# Patient Record
Sex: FEMALE | Race: WHITE
[De-identification: names, ages, dates, MRNs, and addresses within clinical notes are randomized per-mention and may not be internally consistent; named-entity substitution may affect disease eponyms.]

---

## 2020-02-06 ENCOUNTER — HOSPITAL ENCOUNTER (OUTPATIENT)
Dept: HOSPITAL 76 - LAB.N | Age: 20
Discharge: HOME | End: 2020-02-06
Attending: NURSE PRACTITIONER
Payer: MEDICAID

## 2020-02-06 DIAGNOSIS — Z79.899: Primary | ICD-10-CM

## 2020-02-06 LAB
ALBUMIN DIAFP-MCNC: 4.3 G/DL (ref 3.2–5.5)
ALBUMIN/GLOB SERPL: 1.2 {RATIO} (ref 1–2.2)
ALP SERPL-CCNC: 86 IU/L (ref 42–121)
ALT SERPL W P-5'-P-CCNC: 27 IU/L (ref 10–60)
ANION GAP SERPL CALCULATED.4IONS-SCNC: 12 MMOL/L (ref 6–13)
AST SERPL W P-5'-P-CCNC: 23 IU/L (ref 10–42)
BASOPHILS NFR BLD AUTO: 0 10^3/UL (ref 0–0.1)
BASOPHILS NFR BLD AUTO: 0.5 %
BILIRUB BLD-MCNC: 0.3 MG/DL (ref 0.2–1)
BUN SERPL-MCNC: 7 MG/DL (ref 6–20)
CALCIUM UR-MCNC: 9.3 MG/DL (ref 8.5–10.3)
CHLORIDE SERPL-SCNC: 101 MMOL/L (ref 101–111)
CO2 SERPL-SCNC: 25 MMOL/L (ref 21–32)
CREAT SERPLBLD-SCNC: 0.6 MG/DL (ref 0.4–1)
EOSINOPHIL # BLD AUTO: 0.1 10^3/UL (ref 0–0.7)
EOSINOPHIL NFR BLD AUTO: 1.2 %
ERYTHROCYTE [DISTWIDTH] IN BLOOD BY AUTOMATED COUNT: 14.4 % (ref 12–15)
GFRSERPLBLD MDRD-ARVRAT: 129 ML/MIN/{1.73_M2} (ref 89–?)
GLOBULIN SER-MCNC: 3.5 G/DL (ref 2.1–4.2)
GLUCOSE SERPL-MCNC: 95 MG/DL (ref 70–100)
HGB UR QL STRIP: 11.6 G/DL (ref 12–16)
LYMPHOCYTES # SPEC AUTO: 2.1 10^3/UL (ref 1.5–3.5)
LYMPHOCYTES NFR BLD AUTO: 28.1 %
MCH RBC QN AUTO: 27.9 PG (ref 27–31)
MCHC RBC AUTO-ENTMCNC: 31.4 G/DL (ref 32–36)
MCV RBC AUTO: 88.9 FL (ref 81–99)
MONOCYTES # BLD AUTO: 0.5 10^3/UL (ref 0–1)
MONOCYTES NFR BLD AUTO: 6.6 %
NEUTROPHILS # BLD AUTO: 4.6 10^3/UL (ref 1.5–6.6)
NEUTROPHILS # SNV AUTO: 7.3 X10^3/UL (ref 4.8–10.8)
NEUTROPHILS NFR BLD AUTO: 63.3 %
PDW BLD AUTO: 10.9 FL (ref 7.9–10.8)
PLATELET # BLD: 367 10^3/UL (ref 130–450)
PROT SPEC-MCNC: 7.8 G/DL (ref 6.7–8.2)
RBC MAR: 4.16 10^6/UL (ref 4.2–5.4)
SODIUM SERPLBLD-SCNC: 138 MMOL/L (ref 135–145)

## 2020-02-06 PROCEDURE — 85025 COMPLETE CBC W/AUTO DIFF WBC: CPT

## 2020-02-06 PROCEDURE — 84443 ASSAY THYROID STIM HORMONE: CPT

## 2020-02-06 PROCEDURE — 80053 COMPREHEN METABOLIC PANEL: CPT

## 2020-02-06 PROCEDURE — 80175 DRUG SCREEN QUAN LAMOTRIGINE: CPT

## 2020-02-06 PROCEDURE — 36415 COLL VENOUS BLD VENIPUNCTURE: CPT

## 2020-03-23 ENCOUNTER — HOSPITAL ENCOUNTER (EMERGENCY)
Dept: HOSPITAL 76 - ED | Age: 20
Discharge: HOME | End: 2020-03-23
Payer: MEDICAID

## 2020-03-23 VITALS — DIASTOLIC BLOOD PRESSURE: 105 MMHG | SYSTOLIC BLOOD PRESSURE: 153 MMHG

## 2020-03-23 DIAGNOSIS — J02.0: Primary | ICD-10-CM

## 2020-03-23 PROCEDURE — 99283 EMERGENCY DEPT VISIT LOW MDM: CPT

## 2020-03-23 PROCEDURE — 99284 EMERGENCY DEPT VISIT MOD MDM: CPT

## 2020-03-23 PROCEDURE — 87430 STREP A AG IA: CPT

## 2020-03-23 PROCEDURE — 87070 CULTURE OTHR SPECIMN AEROBIC: CPT

## 2020-03-23 NOTE — ED PHYSICIAN DOCUMENTATION
History of Present Illness





- Stated complaint


Stated Complaint: COUGH,SORE THROAT





- Chief complaint


Chief Complaint: Resp





- History obtained from


History obtained from: Patient





Results





- Vitals


Vitals: 


                               Vital Signs - 24 hr











  03/23/20





  19:25


 


Temperature 97.7 C H


 


Heart Rate 112 H


 


Respiratory 18





Rate 


 


Blood Pressure 153/105 H


 


O2 Saturation 100








                                     Oxygen











O2 Source                      Room air

















PD MEDICAL DECISION MAKING





- ED course


ED course: 





Downtime H&P for Kimberly Benavides date of birth 2000


Time seen 1955 on 3/23/2020


Chief complaint sore throat and cough


HPI previously healthy 19-year-old has had a nonproductive cough for 5 days.  

Shortness of breath that is mild with exertion only.  Temperature up to 102.  

Sore throat especially on the right and unable to eat because of pain on the 

right side of the throat.  No runny nose or sick contacts.  No recent travel.


Review of systems


General positive for fevers and chills


ENT positive for sore throat negative for runny nose


Pulmonary positive for cough and shortness of breath


CV negative for chest pain


GI negative for abdominal pain or nausea.


Past medical history none


Social history lives with her mother


Exam vital signs notable for mild tachycardia and hypertension


General well-appearing woman in no distress


ENT notable for some ulcerations on the tip of the right tongue consistent with 

viral etiology, tonsillar pillars are red but no tonsillar exudates.


Neck no adenopathy


Pulmonary clear to auscultation bilaterally nonlabored


Abdomen nontender


Skin no rash


Neuro A/O x3


Medical decision making, rapid strep negative here but her sisters rapid strep 

who is also with her was positive and therefore treating presumptively.


Diagnosis strep pharyngitis


Prescription azithromycin Z-Gil due to penicillin allergy 


Hydrocodone 5/325 1-2 every 6 hours as needed pain #10 no refills





Instructions follow-up with your doctor in 1 week, return for new or worsening 

symptoms.  Plenty fluids.





Departure





- Departure


Disposition: 01 Home, Self Care


Clinical Impression: 


 Strep pharyngitis





Condition: Good

## 2020-03-30 ENCOUNTER — HOSPITAL ENCOUNTER (OUTPATIENT)
Dept: HOSPITAL 76 - COV | Age: 20
Discharge: HOME | End: 2020-03-30
Attending: FAMILY MEDICINE
Payer: MEDICAID

## 2020-03-30 DIAGNOSIS — R50.9: Primary | ICD-10-CM

## 2020-03-30 PROCEDURE — 81599 UNLISTED MAAA: CPT

## 2020-04-22 ENCOUNTER — HOSPITAL ENCOUNTER (EMERGENCY)
Dept: HOSPITAL 76 - ED | Age: 20
Discharge: HOME | End: 2020-04-22
Payer: MEDICAID

## 2020-04-22 VITALS — SYSTOLIC BLOOD PRESSURE: 150 MMHG | DIASTOLIC BLOOD PRESSURE: 103 MMHG

## 2020-04-22 DIAGNOSIS — B37.3: ICD-10-CM

## 2020-04-22 DIAGNOSIS — R30.0: Primary | ICD-10-CM

## 2020-04-22 LAB
C KRUSEI DNA VAG QL NAA+PROBE: (no result)
CANDIDA DNA VAG QL NAA+PROBE: (no result)
CLARITY UR REFRACT.AUTO: CLEAR
GLUCOSE UR QL STRIP.AUTO: NEGATIVE MG/DL
HCG UR QL: NEGATIVE
KETONES UR QL STRIP.AUTO: NEGATIVE MG/DL
NITRITE UR QL STRIP.AUTO: NEGATIVE
PH UR STRIP.AUTO: 6 PH (ref 5–7.5)
PROT UR STRIP.AUTO-MCNC: NEGATIVE MG/DL
RBC # UR STRIP.AUTO: (no result) /UL
RBC # URNS HPF: (no result) /HPF (ref 0–5)
SP GR UR STRIP.AUTO: >=1.03 (ref 1–1.03)
SQUAMOUS URNS QL MICRO: (no result)
T VAGINALIS RRNA GENITAL QL PROBE: (no result)
UROBILINOGEN UR QL STRIP.AUTO: (no result) E.U./DL
UROBILINOGEN UR STRIP.AUTO-MCNC: NEGATIVE MG/DL

## 2020-04-22 PROCEDURE — 87086 URINE CULTURE/COLONY COUNT: CPT

## 2020-04-22 PROCEDURE — 87491 CHLMYD TRACH DNA AMP PROBE: CPT

## 2020-04-22 PROCEDURE — 87661 TRICHOMONAS VAGINALIS AMPLIF: CPT

## 2020-04-22 PROCEDURE — 99283 EMERGENCY DEPT VISIT LOW MDM: CPT

## 2020-04-22 PROCEDURE — 99284 EMERGENCY DEPT VISIT MOD MDM: CPT

## 2020-04-22 PROCEDURE — 87481 CANDIDA DNA AMP PROBE: CPT

## 2020-04-22 PROCEDURE — 81025 URINE PREGNANCY TEST: CPT

## 2020-04-22 PROCEDURE — 87801 DETECT AGNT MULT DNA AMPLI: CPT

## 2020-04-22 PROCEDURE — 81001 URINALYSIS AUTO W/SCOPE: CPT

## 2020-04-22 PROCEDURE — 87591 N.GONORRHOEAE DNA AMP PROB: CPT

## 2020-04-22 PROCEDURE — 81003 URINALYSIS AUTO W/O SCOPE: CPT

## 2020-04-22 NOTE — ED PHYSICIAN DOCUMENTATION
History of Present Illness





- Stated complaint


Stated Complaint: FEMALE 





- Chief complaint


Chief Complaint: Abd Pain





- History obtained from


History obtained from: Patient, Family





- History of Present Illness


Timing: Other (1 month ago after taking 2 rounds of abx for strep pharyngitis)


Pain level max: 7


Pain level now: 6





- Additonal information


Additional information: 





19-year-old female presents to the emergency department stating that she has 

vaginal discharge that is thick and white for the past month.  Itching and 

burning.  Nothing makes it better or worse.  Started after antibiotics for strep

throat.  She is having burning with urination as well.  States she is not 

sexually active.





Review of Systems


Constitutional: denies: Fever, Chills


Cardiac: denies: Chest pain / pressure


Respiratory: denies: Cough


GI: denies: Vomiting, Diarrhea


Skin: denies: Rash


Musculoskeletal: denies: Neck pain, Back pain


Neurologic: denies: Headache





PD PAST MEDICAL HISTORY





- Past Medical History


Past Medical History: No





- Past Surgical History


Past Surgical History: No





- Present Medications


Home Medications: 


                                Ambulatory Orders











 Medication  Instructions  Recorded  Confirmed


 


Fluconazole [Diflucan] 150 mg PO Q3D #3 tablet 04/22/20 














- Allergies


Allergies/Adverse Reactions: 


                                    Allergies











Allergy/AdvReac Type Severity Reaction Status Date / Time


 


Penicillins Allergy  Rash Verified 04/22/20 18:07














- Living Situation


Living Situation: reports: With family


Living Arrangement: reports: At home





- Social History


Does the pt smoke?: No


Does the pt drink ETOH?: No


Does the pt have substance abuse?: No





- Family History


Family history: reports: Non contributory





PD ED PE NORMAL





- Vitals


Vital signs reviewed: Yes





- General


General: Alert and oriented X 3, No acute distress





- HEENT


HEENT: Moist mucous membranes





- Neck


Neck: Supple, no meningeal sign





- Cardiac


Cardiac: RRR





- Respiratory


Respiratory: No respiratory distress, Clear bilaterally





- Abdomen


Abdomen: Soft, Non tender, Non distended





- Female 


Female : Chaperone present (TUCKER Burnham), Other (Speculum exam not performed 

due to patient comfort.  External exam reveals an erythematous introitus and 

vulva.  Thick white discharge.  Small satellite lesions.)





- Back


Back: No CVA TTP





- Derm


Derm: Warm and dry





- Neuro


Neuro: Alert and oriented X 3





Results





- Vitals


Vitals: 


                               Vital Signs - 24 hr











  04/22/20





  18:02


 


Temperature 36.5 C


 


Heart Rate 106 H


 


Respiratory 16





Rate 


 


Blood Pressure 150/103 H


 


O2 Saturation 100








                                     Oxygen











O2 Source                      Room air

















PD MEDICAL DECISION MAKING





- ED course


Complexity details: reviewed results, re-evaluated patient, considered 

differential, d/w patient, d/w family


ED course: 





Patient with what appears to be a vaginal yeast infection.  This appears to be a

 significant infection.  Will place on Diflucan orally.  We will give her a dose

 here followed by 3 doses at home 3 days apart.  She is well-appearing, 

nontoxic.  Afebrile.  Not sexually active.  Confirmatory swabs were sent to the 

lab.  No evidence of UTI.  Patient counseled regarding signs and symptoms for 

which I believe and urgent re-evaluation would be necessary. Patient with good 

understanding of and agreement to plan and is comfortable going home at this 

time





This document was made in part using voice recognition software. While efforts 

are made to proofread this document, sound alike and grammatical errors may 

occur.





Departure





- Departure


Disposition: 01 Home, Self Care


Clinical Impression: 


 Vaginal yeast infection





Condition: Good


Instructions:  ED Vaginal Infec Fungal Candida


Follow-Up: 


Sarah Blackman PA-C [Primary Care Provider] - 


Prescriptions: 


Fluconazole [Diflucan] 150 mg PO Q3D #3 tablet


Comments: 


Use the pills as prescribed.  Return if you worsen.  You were given the first 

dose tonight.  Take the next pill in 3 days and then follow that with the other 

pills.  We will call you if a change in your medication is required.

## 2020-04-27 ENCOUNTER — HOSPITAL ENCOUNTER (OUTPATIENT)
Dept: HOSPITAL 76 - LAB.R | Age: 20
Discharge: HOME | End: 2020-04-27
Attending: PHYSICIAN ASSISTANT
Payer: MEDICAID

## 2020-04-27 DIAGNOSIS — B37.9: Primary | ICD-10-CM

## 2020-04-27 LAB
C KRUSEI DNA VAG QL NAA+PROBE: NEGATIVE
CANDIDA DNA VAG QL NAA+PROBE: NEGATIVE
T VAGINALIS RRNA GENITAL QL PROBE: NEGATIVE

## 2020-04-27 PROCEDURE — 87661 TRICHOMONAS VAGINALIS AMPLIF: CPT

## 2020-04-27 PROCEDURE — 87801 DETECT AGNT MULT DNA AMPLI: CPT

## 2020-05-11 ENCOUNTER — HOSPITAL ENCOUNTER (OUTPATIENT)
Dept: HOSPITAL 76 - LAB.R | Age: 20
Discharge: HOME | End: 2020-05-11
Attending: NURSE PRACTITIONER
Payer: MEDICAID

## 2020-05-11 DIAGNOSIS — N76.0: Primary | ICD-10-CM

## 2020-05-11 PROCEDURE — 87801 DETECT AGNT MULT DNA AMPLI: CPT

## 2020-05-11 PROCEDURE — 87661 TRICHOMONAS VAGINALIS AMPLIF: CPT

## 2020-05-19 ENCOUNTER — HOSPITAL ENCOUNTER (OUTPATIENT)
Dept: HOSPITAL 76 - LAB.R | Age: 20
Discharge: HOME | End: 2020-05-19
Attending: NURSE PRACTITIONER
Payer: MEDICAID

## 2020-05-19 ENCOUNTER — HOSPITAL ENCOUNTER (OUTPATIENT)
Dept: HOSPITAL 76 - LAB.WCP | Age: 20
Discharge: HOME | End: 2020-05-19
Attending: NURSE PRACTITIONER
Payer: MEDICAID

## 2020-05-19 DIAGNOSIS — N76.0: Primary | ICD-10-CM

## 2020-05-19 DIAGNOSIS — B37.9: ICD-10-CM

## 2020-05-19 LAB
ANION GAP SERPL CALCULATED.4IONS-SCNC: 8 MMOL/L (ref 6–13)
BASOPHILS NFR BLD AUTO: 0 10^3/UL (ref 0–0.1)
BASOPHILS NFR BLD AUTO: 0.7 %
BUN SERPL-MCNC: 17 MG/DL (ref 6–20)
C KRUSEI DNA VAG QL NAA+PROBE: NEGATIVE
CALCIUM UR-MCNC: 9.5 MG/DL (ref 8.5–10.3)
CANDIDA DNA VAG QL NAA+PROBE: NEGATIVE
CHLORIDE SERPL-SCNC: 110 MMOL/L (ref 101–111)
CO2 SERPL-SCNC: 22 MMOL/L (ref 21–32)
CREAT SERPLBLD-SCNC: 0.6 MG/DL (ref 0.4–1)
EOSINOPHIL # BLD AUTO: 0.2 10^3/UL (ref 0–0.7)
EOSINOPHIL NFR BLD AUTO: 2.7 %
ERYTHROCYTE [DISTWIDTH] IN BLOOD BY AUTOMATED COUNT: 14.6 % (ref 12–15)
EST. AVERAGE GLUCOSE BLD GHB EST-MCNC: 100 MG/DL (ref 70–100)
GLUCOSE SERPL-MCNC: 87 MG/DL (ref 70–100)
HB2 TOTAL: 13.9 G/DL
HBA1C BLD-MCNC: 0.45 G/DL
HEMOGLOBIN A1C %: 5.1 % (ref 4.6–6.2)
HGB UR QL STRIP: 12.7 G/DL (ref 12–16)
LYMPHOCYTES # SPEC AUTO: 2.1 10^3/UL (ref 1.5–3.5)
LYMPHOCYTES NFR BLD AUTO: 34.8 %
MCH RBC QN AUTO: 26.6 PG (ref 27–31)
MCHC RBC AUTO-ENTMCNC: 30.5 G/DL (ref 32–36)
MCV RBC AUTO: 87.2 FL (ref 81–99)
MONOCYTES # BLD AUTO: 0.4 10^3/UL (ref 0–1)
MONOCYTES NFR BLD AUTO: 6.4 %
NEUTROPHILS # BLD AUTO: 3.3 10^3/UL (ref 1.5–6.6)
NEUTROPHILS # SNV AUTO: 5.9 X10^3/UL (ref 4.8–10.8)
NEUTROPHILS NFR BLD AUTO: 55.1 %
PDW BLD AUTO: 11.3 FL (ref 7.9–10.8)
PLATELET # BLD: 301 10^3/UL (ref 130–450)
RBC MAR: 4.78 10^6/UL (ref 4.2–5.4)
SODIUM SERPLBLD-SCNC: 140 MMOL/L (ref 135–145)
T VAGINALIS RRNA GENITAL QL PROBE: NEGATIVE

## 2020-05-19 PROCEDURE — 85025 COMPLETE CBC W/AUTO DIFF WBC: CPT

## 2020-05-19 PROCEDURE — 80048 BASIC METABOLIC PNL TOTAL CA: CPT

## 2020-05-19 PROCEDURE — 87661 TRICHOMONAS VAGINALIS AMPLIF: CPT

## 2020-05-19 PROCEDURE — 87801 DETECT AGNT MULT DNA AMPLI: CPT

## 2020-05-19 PROCEDURE — 36415 COLL VENOUS BLD VENIPUNCTURE: CPT

## 2020-05-19 PROCEDURE — 83036 HEMOGLOBIN GLYCOSYLATED A1C: CPT

## 2020-05-28 ENCOUNTER — HOSPITAL ENCOUNTER (OUTPATIENT)
Dept: HOSPITAL 76 - LAB.WCP | Age: 20
Discharge: HOME | End: 2020-05-28
Attending: OBSTETRICS & GYNECOLOGY
Payer: MEDICAID

## 2020-05-28 ENCOUNTER — HOSPITAL ENCOUNTER (OUTPATIENT)
Dept: HOSPITAL 76 - LAB.R | Age: 20
Discharge: HOME | End: 2020-05-28
Attending: OBSTETRICS & GYNECOLOGY
Payer: MEDICAID

## 2020-05-28 DIAGNOSIS — B37.9: Primary | ICD-10-CM

## 2020-05-28 LAB
ALT SERPL W P-5'-P-CCNC: 28 IU/L (ref 10–60)
AST SERPL W P-5'-P-CCNC: 24 IU/L (ref 10–42)
C KRUSEI DNA VAG QL NAA+PROBE: NEGATIVE
CANDIDA DNA VAG QL NAA+PROBE: NEGATIVE
T VAGINALIS RRNA GENITAL QL PROBE: NEGATIVE

## 2020-05-28 PROCEDURE — 87661 TRICHOMONAS VAGINALIS AMPLIF: CPT

## 2020-05-28 PROCEDURE — 36415 COLL VENOUS BLD VENIPUNCTURE: CPT

## 2020-05-28 PROCEDURE — 87801 DETECT AGNT MULT DNA AMPLI: CPT

## 2020-05-28 PROCEDURE — 84460 ALANINE AMINO (ALT) (SGPT): CPT

## 2020-05-28 PROCEDURE — 84450 TRANSFERASE (AST) (SGOT): CPT

## 2020-07-07 ENCOUNTER — HOSPITAL ENCOUNTER (OUTPATIENT)
Dept: HOSPITAL 76 - DI | Age: 20
Discharge: HOME | End: 2020-07-07
Attending: PHYSICIAN ASSISTANT
Payer: MEDICAID

## 2020-07-07 DIAGNOSIS — R13.10: Primary | ICD-10-CM

## 2020-07-07 DIAGNOSIS — E04.1: ICD-10-CM

## 2020-07-07 PROCEDURE — 76536 US EXAM OF HEAD AND NECK: CPT

## 2020-07-08 NOTE — ULTRASOUND REPORT
PROCEDURE:  Head or Neck Soft Tissue

 

INDICATIONS:  DYSPHAGIA

 

TECHNIQUE:  

Real-time scanning was performed of the thyroid gland, with image documentation.  

 

COMPARISON:  None.

 

FINDINGS:  

 

Right:  4.6 x 1.1 x 1.8 cm. Occasional tiny colloid cysts measuring less than 5 mm. No dominant mass.


 

Left:  4.1 x 0.9 x 1.6 cm. Occasional scattered sub-5 mm colloid cysts. No dominant mass.

 

Isthmus:  3 mm

 

Normal morphology lymph nodes in the lateral compartment. No bulky adenopathy in either cervical johnathan
n. No suspicious soft tissue mass.

 

IMPRESSION:  

 

 

1. Normal thyroid gland with occasional tiny colloid cysts.

 

2. No cervical chain adenopathy on either side..

 

Reviewed by: Nilam Felton MD on 7/8/2020 9:21 AM PDT

Approved by: Nilam Felton MD on 7/8/2020 9:21 AM PDT

 

 

Station ID:  [id number]

## 2020-07-20 ENCOUNTER — HOSPITAL ENCOUNTER (OUTPATIENT)
Dept: HOSPITAL 76 - DI | Age: 20
Discharge: HOME | End: 2020-07-20
Attending: PHYSICIAN ASSISTANT
Payer: MEDICAID

## 2020-07-20 DIAGNOSIS — R59.0: Primary | ICD-10-CM

## 2020-07-20 PROCEDURE — 70491 CT SOFT TISSUE NECK W/DYE: CPT

## 2020-07-20 NOTE — CT REPORT
PROCEDURE:  SOFT TISSUE NECK W

 

INDICATIONS:  CERVICAL LYMPHADENOPATHY

 

CONTRAST:  IV CONTRAST: Optiray 320 ml: 80 PO CONTRAST: *NO PO CONTRAST 

 

TECHNIQUE:  

After the administration of intravenous contrast, 3.0 mm axial sections acquired from the sella to th
e aortic arch.  Additional oblique axial 3.0 mm sections acquired through the pharynx.  3 mm thick co
charisse reformats were generated.  For radiation dose reduction, the following was used:  automated exp
osure control, adjustment of mA and/or kV according to patient size.

 

COMPARISON:  Ultrasound neck, 07/07/2020.

 

FINDINGS:  

Image quality:  Excellent.  

 

Lymph nodes: Borderline enlarged lymph nodes are seen in the right the neck. For example, a 1 cm leve
l III cervical lymph node is identified. 

 

Vessels:  Visualized vasculature appears patent.  

 

Neck spaces:  Deep to the skin marker is the right sternocleidomastoid muscle. Mass is identified in 
the area of interest.

 

The oropharynx, nasopharynx, and pharynx demonstrate no mucosal lesions.  The vocal cords, false voca
l cords, pyriform sinuses, epiglottis, vallecula, and tongue base all appear normal.  Extramucosal sp
aces appear unremarkable.  

 

Glands:  The parotid and submandibular glands appear normal.  The thyroid is normal in size. 

 

Miscellaneous:  Visualized brain and orbits appear normal.  Lung apices appear clear.  Superficial so
ft tissues appear normal.

 

Bones:  No suspicious bony lesions.  Some of cervical lordosis, which may be secondary to positioning
. Visualized sinuses and mastoids appear unremarkable.  

 

IMPRESSION: 

 

1. Borderline sized right cervical nodes measure up to 1 cm in short axis, most likely reactive.

2. No mass or grossly enlarged lymph nodes are identified in the area of interest deep to the skin anthony clark.

 

Reviewed by: Johnnie Moore MD on 7/20/2020 4:16 PM PDT

Approved by: Johnnie Moore MD on 7/20/2020 4:16 PM PDT

 

 

Station ID:  [id number]

## 2020-08-24 ENCOUNTER — HOSPITAL ENCOUNTER (OUTPATIENT)
Dept: HOSPITAL 76 - DI | Age: 20
Discharge: HOME | End: 2020-08-24
Attending: PHYSICIAN ASSISTANT
Payer: MEDICAID

## 2020-08-24 DIAGNOSIS — R51: Primary | ICD-10-CM

## 2020-08-24 PROCEDURE — 70553 MRI BRAIN STEM W/O & W/DYE: CPT

## 2020-08-24 NOTE — MRI REPORT
PROCEDURE:  Brain W/WO

 

INDICATIONS:  HEADACHE

 

CONTRAST:  IV CONTRAST: Gadavist ml: 8  

 

TECHNIQUE:  

Noncontrast axial T1 spin echo, axial T2 fast spin echo, sagittal and axial FLAIR, coronal T2 fast sp
in echo, axial gradient echo, axial diffusion and ADC through the brain.  After the administration of
 contrast, axial and coronal T1 spin echo with fat saturation through the brain.  

 

COMPARISON:  None.

 

FINDINGS:  

Image quality: Diagnostic

 

CSF spaces:  Basal cisterns are patent.  No extra-axial fluid collections.  Ventricles are normal in 
size and shape.  

 

Brain:  No midline shift.  No intracranial bleeds or masses.  No abnormal intracranial enhancement.  
There is cerebral volume loss for age.  There is periventricular white matter chronic small vessel is
chemic change.  The brainstem appears normal.  Diffusion-weighted images demonstrate no acute ischemi
c insults.  No chronic ischemic insults.  Normal intravascular flow voids are present.  

 

Skull and face:  Calvarial marrow is normal in signal.  Orbits appear normal.  

 

Sinuses:  Sinuses and mastoids appear clear.  

 

 

 

IMPRESSION:  A cause of headache cannot be seen on these images.

 

Reviewed by: Glen Sanches MD on 8/24/2020 5:04 PM [facility]

Approved by: Glen Sanches MD on 8/24/2020 5:04 PM [facility]

 

 

Station ID:  [id number]

## 2020-08-27 ENCOUNTER — HOSPITAL ENCOUNTER (OUTPATIENT)
Dept: HOSPITAL 76 - DI.N | Age: 20
Discharge: HOME | End: 2020-08-27
Attending: OBSTETRICS & GYNECOLOGY
Payer: MEDICAID

## 2020-08-27 DIAGNOSIS — N39.42: Primary | ICD-10-CM

## 2020-08-27 PROCEDURE — 74019 RADEX ABDOMEN 2 VIEWS: CPT

## 2020-09-21 ENCOUNTER — HOSPITAL ENCOUNTER (OUTPATIENT)
Dept: HOSPITAL 76 - COV | Age: 20
Discharge: HOME | End: 2020-09-21
Attending: FAMILY MEDICINE
Payer: MEDICAID

## 2020-09-21 DIAGNOSIS — R50.9: Primary | ICD-10-CM

## 2020-09-21 DIAGNOSIS — Z20.828: ICD-10-CM

## 2020-10-30 ENCOUNTER — HOSPITAL ENCOUNTER (EMERGENCY)
Dept: HOSPITAL 76 - ED | Age: 20
Discharge: HOME | End: 2020-10-30
Payer: MEDICAID

## 2020-10-30 VITALS — SYSTOLIC BLOOD PRESSURE: 104 MMHG | DIASTOLIC BLOOD PRESSURE: 67 MMHG

## 2020-10-30 DIAGNOSIS — K62.5: Primary | ICD-10-CM

## 2020-10-30 DIAGNOSIS — Z87.891: ICD-10-CM

## 2020-10-30 DIAGNOSIS — R10.31: ICD-10-CM

## 2020-10-30 LAB
ALBUMIN DIAFP-MCNC: 4.1 G/DL (ref 3.2–5.5)
ALBUMIN/GLOB SERPL: 1.2 {RATIO} (ref 1–2.2)
ALP SERPL-CCNC: 110 IU/L (ref 42–121)
ALT SERPL W P-5'-P-CCNC: 17 IU/L (ref 10–60)
ANION GAP SERPL CALCULATED.4IONS-SCNC: 13 MMOL/L (ref 6–13)
AST SERPL W P-5'-P-CCNC: 14 IU/L (ref 10–42)
BASOPHILS NFR BLD AUTO: 0 10^3/UL (ref 0–0.1)
BASOPHILS NFR BLD AUTO: 0.6 %
BILIRUB BLD-MCNC: 0.5 MG/DL (ref 0.2–1)
BUN SERPL-MCNC: 11 MG/DL (ref 6–20)
CALCIUM UR-MCNC: 9.2 MG/DL (ref 8.5–10.3)
CHLORIDE SERPL-SCNC: 103 MMOL/L (ref 101–111)
CLARITY UR REFRACT.AUTO: CLEAR
CO2 SERPL-SCNC: 22 MMOL/L (ref 21–32)
CREAT SERPLBLD-SCNC: 0.7 MG/DL (ref 0.4–1)
CRP SERPL-MCNC: < 1 MG/DL (ref 0–1)
EOSINOPHIL # BLD AUTO: 0.3 10^3/UL (ref 0–0.7)
EOSINOPHIL NFR BLD AUTO: 4.8 %
ERYTHROCYTE [DISTWIDTH] IN BLOOD BY AUTOMATED COUNT: 13.9 % (ref 12–15)
GLOBULIN SER-MCNC: 3.4 G/DL (ref 2.1–4.2)
GLUCOSE SERPL-MCNC: 92 MG/DL (ref 70–100)
GLUCOSE UR QL STRIP.AUTO: NEGATIVE MG/DL
HGB UR QL STRIP: 12.3 G/DL (ref 12–16)
KETONES UR QL STRIP.AUTO: NEGATIVE MG/DL
LYMPHOCYTES # SPEC AUTO: 2 10^3/UL (ref 1.5–3.5)
LYMPHOCYTES NFR BLD AUTO: 32.5 %
MCH RBC QN AUTO: 27.8 PG (ref 27–31)
MCHC RBC AUTO-ENTMCNC: 31.9 G/DL (ref 32–36)
MCV RBC AUTO: 86.9 FL (ref 81–99)
MONOCYTES # BLD AUTO: 0.4 10^3/UL (ref 0–1)
MONOCYTES NFR BLD AUTO: 5.8 %
NEUTROPHILS # BLD AUTO: 3.5 10^3/UL (ref 1.5–6.6)
NEUTROPHILS # SNV AUTO: 6.2 X10^3/UL (ref 4.8–10.8)
NEUTROPHILS NFR BLD AUTO: 56 %
NITRITE UR QL STRIP.AUTO: NEGATIVE
PDW BLD AUTO: 10 FL (ref 7.9–10.8)
PH UR STRIP.AUTO: 5.5 PH (ref 5–7.5)
PLATELET # BLD: 263 10^3/UL (ref 130–450)
PROT SPEC-MCNC: 7.5 G/DL (ref 6.7–8.2)
PROT UR STRIP.AUTO-MCNC: NEGATIVE MG/DL
RBC # UR STRIP.AUTO: NEGATIVE /UL
RBC MAR: 4.43 10^6/UL (ref 4.2–5.4)
SODIUM SERPLBLD-SCNC: 138 MMOL/L (ref 135–145)
SP GR UR STRIP.AUTO: >=1.03 (ref 1–1.03)
UROBILINOGEN UR QL STRIP.AUTO: (no result) E.U./DL
UROBILINOGEN UR STRIP.AUTO-MCNC: NEGATIVE MG/DL

## 2020-10-30 PROCEDURE — 81001 URINALYSIS AUTO W/SCOPE: CPT

## 2020-10-30 PROCEDURE — 85025 COMPLETE CBC W/AUTO DIFF WBC: CPT

## 2020-10-30 PROCEDURE — 87086 URINE CULTURE/COLONY COUNT: CPT

## 2020-10-30 PROCEDURE — 99284 EMERGENCY DEPT VISIT MOD MDM: CPT

## 2020-10-30 PROCEDURE — 80053 COMPREHEN METABOLIC PANEL: CPT

## 2020-10-30 PROCEDURE — 36415 COLL VENOUS BLD VENIPUNCTURE: CPT

## 2020-10-30 PROCEDURE — 81003 URINALYSIS AUTO W/O SCOPE: CPT

## 2020-10-30 PROCEDURE — 99283 EMERGENCY DEPT VISIT LOW MDM: CPT

## 2020-10-30 PROCEDURE — 86140 C-REACTIVE PROTEIN: CPT

## 2020-10-30 NOTE — ED PHYSICIAN DOCUMENTATION
PD HPI GI BLEED





- Stated complaint


Stated Complaint: BLOOD IN STOOL





- Chief complaint


Chief Complaint: Abd Pain





- History obtained from


History obtained from: Patient





- History of Present Illness


Timing - onset: How many days ago (2-3 days of some red blood mixed with stools 

and also red blood with wiping. Has rectal pain with the BMs as well. Is having 

formed stool. Noted some intermittent rlq/suprapubic area pains with pushing for

BM. No nausea nor vomiting.)


Timing - duration: Days


Timing - details: Gradual onset, Still present


Associated symptoms: BRBPR.  No: Vomiting, Diarrhea, Constipation


Contributing factors: No: Sick contact, Bad food, Travel


Improved by: Laying still.  No: Eating


Worsened by: Other (BM).  No: Eating


Similar symptoms before: Has not had sx before





Review of Systems


Constitutional: denies: Fever, Chills, Myalgias


Throat: denies: Sore throat


Cardiac: denies: Chest pain / pressure


Respiratory: denies: Cough


GI: reports: Abdominal Pain, Bloody / black stool (bright red, but somewhat 

mixed with the formed stool.).  denies: Nausea, Vomiting, Constipation, 

Diarrhea, Hematemesis


Skin: denies: Rash, Lesions


Endocrine: denies: Weight loss, Easy bruising / bleeding





PD PAST MEDICAL HISTORY





- Past Medical History


Cardiovascular: None


Respiratory: None


Neuro: Migraines


Endocrine/Autoimmune: None


GI: Chronic constipation


: Incontinence, Nocturia, Frequency


Psych: Bipolar disorder


Musculoskeletal: None


Derm: None





- Past Surgical History


Past Surgical History: No


Ortho: Other





- Present Medications


Home Medications: 


                                Ambulatory Orders











 Medication  Instructions  Recorded  Confirmed


 


Fluoxetine HCl [Prozac] 20 mg PO DAILY 10/05/20 10/05/20


 


Lithium Carbonate 300 mg PO BID 10/05/20 10/05/20


 


Natural 5 Hcl 200 mg PO DAILY 10/05/20 


 


Prazosin [Minipress] 1 mg PO DAILY 10/05/20 10/05/20


 


Prazosin [Minipress] 2 mg PO DAILY PM 10/05/20 10/05/20


 


Hydrocortisone Acetate [Anucort-Hc] 25 mg RC DAILY #5 supp.rect 10/30/20 


 


Sulfamethox/Trimeth 800/160 1 each PO BID #14 tablet 10/30/20 





[Bactrim Ds 800/160]   














- Allergies


Allergies/Adverse Reactions: 


                                    Allergies











Allergy/AdvReac Type Severity Reaction Status Date / Time


 


Penicillins Allergy  Rash Verified 10/30/20 13:38














- Social History


Does the pt smoke?: No


Smoking Status: Former smoker


Does the pt drink ETOH?: No


Does the pt have substance abuse?: No





PD ED PE NORMAL





- Vitals


Vital signs reviewed: Yes





- General


General: Alert and oriented X 3, No acute distress, Well developed/nourished





- Abdomen


Abdomen: Soft, Non tender





- Female 


Female : Deferred





- Rectal


Rectal: Other (external normal. Digital exam with area of swelling and 

tenderness right side. No noted fluctuance. The exam does exac some of the pain 

right lower abd/pelvic area. No blood on finger. )





- Back


Back: No CVA TTP





- Derm


Derm: Normal color, Warm and dry





- Neuro


Neuro: Alert and oriented X 3, No motor deficit, Normal speech





Results





- Vitals


Vitals: 


                               Vital Signs - 24 hr











  10/30/20 10/30/20





  13:38 15:38


 


Temperature 36.5 C 36.9 C


 


Heart Rate 76 85


 


Respiratory 14 20





Rate  


 


Blood Pressure 124/82 H 104/67


 


O2 Saturation 99 100








                                     Oxygen











O2 Source                      Room air

















- Labs


Labs: 


                                Laboratory Tests











  10/30/20 10/30/20 10/30/20





  14:15 14:26 14:26


 


WBC   6.2 


 


RBC   4.43 


 


Hgb   12.3 


 


Hct   38.5 


 


MCV   86.9 


 


MCH   27.8 


 


MCHC   31.9 L 


 


RDW   13.9 


 


Plt Count   263 


 


MPV   10.0 


 


Neut # (Auto)   3.5 


 


Lymph # (Auto)   2.0 


 


Mono # (Auto)   0.4 


 


Eos # (Auto)   0.3 


 


Baso # (Auto)   0.0 


 


Absolute Nucleated RBC   0.00 


 


Nucleated RBC %   0.0 


 


Sodium    138


 


Potassium    3.9


 


Chloride    103


 


Carbon Dioxide    22


 


Anion Gap    13.0


 


BUN    11


 


Creatinine    0.7


 


Estimated GFR (MDRD)    107


 


Glucose    92


 


Calcium    9.2


 


Total Bilirubin    0.5


 


AST    14


 


ALT    17


 


Alkaline Phosphatase    110


 


C-Reactive Protein    < 1.0


 


Total Protein    7.5


 


Albumin    4.1


 


Globulin    3.4


 


Albumin/Globulin Ratio    1.2


 


Urine Color  YELLOW  


 


Urine Clarity  CLEAR  


 


Urine pH  5.5  


 


Ur Specific Gravity  >=1.030 H  


 


Urine Protein  NEGATIVE  


 


Urine Glucose (UA)  NEGATIVE  


 


Urine Ketones  NEGATIVE  


 


Urine Occult Blood  NEGATIVE  


 


Urine Nitrite  NEGATIVE  


 


Urine Bilirubin  NEGATIVE  


 


Urine Urobilinogen  0.2 (NORMAL)  


 


Ur Leukocyte Esterase  NEGATIVE  


 


Ur Microscopic Review  NOT INDICATED  


 


Urine Culture Comments  NOT INDICATED  














PD MEDICAL DECISION MAKING





- ED course


Complexity details: reviewed results, considered differential (consider internal

hemorrhoid. has rectal tenderness that radiates to mid/right low abd, so also 

consider perirectal infection. Time course and exam does not suggest appendix or

diverticulitis. ), d/w patient





Departure





- Departure


Disposition: 01 Home, Self Care


Clinical Impression: 


 Rectal bleeding





Condition: Stable


Record reviewed to determine appropriate education?: Yes


Instructions:  ED Hematochezia Stable


Follow-Up: 


Sarah Blackman PA-C [Primary Care Provider] - 


DHARMESH Rose MD [Provider Admit Priv/Credential] - 


Prescriptions: 


Hydrocortisone Acetate [Anucort-Hc] 25 mg RC DAILY #5 supp.rect


Sulfamethox/Trimeth 800/160 [Bactrim Ds 800/160] 1 each PO BID #14 tablet


Comments: 


Blood count is good so not suggesting significant blood loss nor signs of sign

ificant infection.





There is little fullness in the rectal area suggesting a hemorrhoid internally. 

Sometimes there can be a small infection in the wall causing the symptoms as 

well.  We can treat it with an anti-inflammatory suppository for hemorrhoid and 

inflammation and antibiotic for possible local infection.





Recheck if not improving well over the next several days.





Other causes for blood with the stool can be higher up and would need a scope to

further investigate.  You can follow-up with surgery office for consultation and

to decide if they feel scope would be appropriate.


Discharge Date/Time: 10/30/20 15:41

## 2022-06-17 NOTE — XRAY REPORT
PROCEDURE:  Abdomen 2 View X-Ray

 

INDICATIONS:  urinary incontinence without awareness.

 

TECHNIQUE:  1 view of the abdomen were acquired.  

 

COMPARISON:  None

 

FINDINGS:  

Surgical changes and devices:  None.  

 

Bowel:  No pneumoperitoneum.  The bowel gas pattern demonstrates mildly prominent bowel dilation in t
he left hemiabdomen.

 

Soft tissues:  No masses; visualized solid organ contours appear normal in size.  No suspicious abdom
inal calcifications.  

 

Bones:  No suspicious bony abnormalities.  

 

IMPRESSION:  Mildly prominent bowel dilation the left hemiabdomen as above. Ileus versus developing o
bstruction cannot be definitively excluded.

 

Reviewed by: Sylvie Maynard MD on 8/27/2020 5:12 PM PDT

Approved by: Sylvie Maynard MD on 8/27/2020 5:12 PM PDT

 

 

Station ID:  [id number] ckd - noted when I reviewed her labs before refill meds and her BP was slightly elevated    She is correct never discussed asthma and I have corrected list of diagnoses. Was tied to [facility] by accident